# Patient Record
Sex: FEMALE | Race: WHITE | NOT HISPANIC OR LATINO | Employment: STUDENT | URBAN - METROPOLITAN AREA
[De-identification: names, ages, dates, MRNs, and addresses within clinical notes are randomized per-mention and may not be internally consistent; named-entity substitution may affect disease eponyms.]

---

## 2023-06-24 LAB
CHLAMYDIA TRACH., AMPLIFIED: NEGATIVE
N. GONORRHEA, AMPLIFIED: NEGATIVE
TRICHOMONAS VAGINALIS: NEGATIVE

## 2024-07-11 ENCOUNTER — HOSPITAL ENCOUNTER (OUTPATIENT)
Dept: RADIOLOGY | Facility: CLINIC | Age: 19
Discharge: HOME | End: 2024-07-11
Payer: COMMERCIAL

## 2024-07-11 ENCOUNTER — APPOINTMENT (OUTPATIENT)
Dept: ORTHOPEDIC SURGERY | Facility: CLINIC | Age: 19
End: 2024-07-11
Payer: COMMERCIAL

## 2024-07-11 DIAGNOSIS — S86.899A ANTERIOR SHIN SPLINTS: ICD-10-CM

## 2024-07-11 DIAGNOSIS — M25.562 ACUTE PAIN OF LEFT KNEE: ICD-10-CM

## 2024-07-11 PROCEDURE — 99203 OFFICE O/P NEW LOW 30 MIN: CPT | Performed by: ORTHOPAEDIC SURGERY

## 2024-07-11 PROCEDURE — 73564 X-RAY EXAM KNEE 4 OR MORE: CPT | Mod: LEFT SIDE | Performed by: RADIOLOGY

## 2024-07-11 PROCEDURE — 73590 X-RAY EXAM OF LOWER LEG: CPT | Mod: RT

## 2024-07-11 PROCEDURE — 73564 X-RAY EXAM KNEE 4 OR MORE: CPT | Mod: LT

## 2024-07-11 PROCEDURE — 73590 X-RAY EXAM OF LOWER LEG: CPT | Mod: LT

## 2024-07-11 RX ORDER — MELOXICAM 15 MG/1
15 TABLET ORAL
Qty: 30 TABLET | Refills: 2 | Status: SHIPPED | OUTPATIENT
Start: 2024-07-11

## 2024-07-11 NOTE — PROGRESS NOTES
"  History of Present Illness:  Patient presents with left knee pain.  The patient localizes diffuse anterior pain.  The patient notes an insidious onset but no recent trauma.  The patient denies mechanical symptoms.  Pain is moderate, achy, diffuse.  Better with rest, worse with activity.     The patient notes that she has noticed this left knee pain that is worse on the right over the past several years and is progressively worsening as she participates in more intense physical activity.  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  She notes increased swelling in the left knee after prolonged physical activity as well as pain along the anterior aspect of the knee with a \"strange feeling\" that occurs along with the swelling.  She denies any obvious mechanical symptoms today such as catching, locking, giving out.    She also endorses symptoms consistent with anterior tibial stress syndrome bilaterally, left worse than right.  She has been utilizing different techniques for running shoes as well as changing the running service that she has been participating on.  She states these have helped her and she notices less symptoms of the shinsplints but wanted to be evaluated just in case.    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  No current outpatient medications on file.    Review of Systems   GENERAL: Negative for malaise, significant weight loss, fever  MUSCULOSKELETAL: see HPI  NEURO:  Negative    Physical Examination:  Left Knee:  No varus or valgus alignment noted  Mild atrophy of VMO  No significant effusion noted  No tenderness over medial or lateral joint line  Tenderness noted with patellar ballotment and " compression and Kilmichael test  Tenderness and mild fullness hoffas fat pad  Mild maltracking noted, with a lateral tilt at extension.  Ligaments stable (varus, valgus, lachman, drawer)  Equivocal mcmurrays  Neurovascular exam within normal limits distally    Negative anterior drawer, negative Lachman, no significant laxity to varus or valgus stress.    Imaging:  No fractures or degenerative changes noted, mild lateral patellar maltracking noted of the left knee on merchant views with mild DJD of the lateral facet patella.  Bilateral tib-fib x-rays reveal no evidence of stress fracture, no acute fracture or dislocation.    Assessment:  Patient with left knee pain consistent with patellofemoral syndrome with lateral patellar tilt and patellar maltracking.  Bilateral anterior tibial stress syndrome    Plan:  We discussed with the patient the differential for anterior knee pain (tendonitis, synovial fat pad impingement, chondromalacia, maltracking, etc).  We discussed treatment modalities including core, hip, quad strengthening as well as hamstring flexibility.  The role of PT, bracing and NSAIDs reviewed and role of surgical intervention for refractory cases.  We do feel the patient may be experiencing 1 or more of the above causes for her anterior knee pain and swelling and we recommended she monitor the symptoms closely over the course of the next several months while she participates in physical therapy to strengthen the quadriceps muscle groups.  If she were to develop more substantial mechanical symptoms such as catching, locking giving out as well as symptoms such as locked knees or profound pain we would recommend MRI at that time to further evaluate the degree of internal derangement.  Strongly recommended physical therapy for now and she will take an order of this to her [employer] to continue her care as needed.    Recommended NSAIDs for today. A nonsteroidal anti-inflammatory drug (NSAID) was prescribed.  We  reviewed the risks (including gastric issues, renal issues) and benefits of the medication.  We discussed a scheduled course if no adverse reactions to help with swelling / pain.  We discussed that chronic usage should be checked with primary care physician.       Also encouraged options to continue her activity modification involving her anterior tibial stress syndrome with appropriate running shoes, running on softer surfaces, and limiting activity due to pain as needed.    Camden Spears PA-C    In a face to face encounter, I evaluated the patient and performed a physical examination, discussed pertinent diagnostic studies if indicated and discussed diagnosis and management strategies with both the patient and physician assistant / nurse practitioner.  I reviewed the PA/NP's note and agree with the documented findings and plan of care.    Patient with some bilateral shinsplints discussed runners evaluation, formal therapy and anti-inflammatories.  She also has some persistent knee pain is anterior but also medial we discussed the possibility of meniscal pathology as well as a prominent plica.  Discussed MRI if she fails to improve.    Chema Hartley MD

## 2024-07-12 ENCOUNTER — TELEPHONE (OUTPATIENT)
Dept: ORTHOPEDIC SURGERY | Facility: CLINIC | Age: 19
End: 2024-07-12
Payer: COMMERCIAL

## 2024-07-12 NOTE — TELEPHONE ENCOUNTER
Goal Outcome Evaluation:   Patient stands and sits with supervision. Ambulates 225' with Rwx and SBA. Actively works thru UE and LE exercises. Patient does independently exercises during the day and states he will be doing them at home.will continue to benefit from strengthening activities.               Pt called and said she was seen ,and was recommenced she go to T[facility] in [place], they are booked ut until the end of August, I think she rreturns to school before that, is there another place that Dr Higgins might want her to go, willing to go anywhere, she is aware Dr Higgins is not back in the office till Tuesday.

## 2024-07-15 ENCOUNTER — TELEPHONE (OUTPATIENT)
Dept: ORTHOPEDIC SURGERY | Facility: CLINIC | Age: 19
End: 2024-07-15
Payer: COMMERCIAL

## 2024-07-15 NOTE — TELEPHONE ENCOUNTER
Lvm for patient, she can have runners eval done anywhere. Recommended calling the back of her ins card for other options.

## 2024-07-18 ENCOUNTER — EVALUATION (OUTPATIENT)
Dept: PHYSICAL THERAPY | Facility: CLINIC | Age: 19
End: 2024-07-18
Payer: COMMERCIAL

## 2024-07-18 DIAGNOSIS — S86.899A ANTERIOR SHIN SPLINTS: ICD-10-CM

## 2024-07-18 DIAGNOSIS — M25.562 ACUTE PAIN OF LEFT KNEE: Primary | ICD-10-CM

## 2024-07-18 PROCEDURE — 97161 PT EVAL LOW COMPLEX 20 MIN: CPT | Mod: GP

## 2024-07-18 PROCEDURE — 97110 THERAPEUTIC EXERCISES: CPT | Mod: GP

## 2024-07-18 ASSESSMENT — PATIENT HEALTH QUESTIONNAIRE - PHQ9
SUM OF ALL RESPONSES TO PHQ9 QUESTIONS 1 AND 2: 0
1. LITTLE INTEREST OR PLEASURE IN DOING THINGS: NOT AT ALL
2. FEELING DOWN, DEPRESSED OR HOPELESS: NOT AT ALL

## 2024-07-18 NOTE — PROGRESS NOTES
Physical Therapy Evaluation and Treatment      Patient Name: Mindy Moseley  MRN: [id number]  Today's Date: 7/18/2024  Time Calculation  Start Time: 1725  Stop Time: 1805  Time Calculation (min): 40 min    Therapy Diagnoses:    Problem List Items Addressed This Visit    None  Visit Diagnoses         Codes    Acute pain of left knee    -  Primary M25.562    Relevant Orders    Follow Up In Physical Therapy    Anterior shin splints     S86.899A            Visit #: 1     Insurance:   Payor: MEDICAL [facility] / Plan: [employer]ICAL [employer] / Product Type: *No Product type* /   Authorization required: no   Reason for visit: Acute pain of left knee; anterior shin splints  Referring Physician: Chema Hartley MD  $15 COPAY 60 PT/OT V PCY 0 USED NO AUTH NEEDED PAYS % OOP 3175.00 30.00 APPLIED //     Assessment:   Mindy Moseley is a 18 y.o. year old female who participated in a physical therapy evaluation today due to complaint of L knee pain and IVA shin pain. Patient reports pain started around 04/2024 when she was playing rugby. Denies any specific fall or hit that would have caused pain, but reports gradual and worsening onset of pain in IVA shins and L knee since. Reports pain in L knee worsened in late May after another tournament. Patient reports she had a session of informal PT done through her school where she was given stretches which helped temporarily, but did not provide lasting relief of sx. Consulted Dr. Hartley and was recommended PT. Reports sx have been the same since stopping her sx. Reports minor pain in shins when driving and walking. Reports she has stopped running and would like to get back into it.. The patient's findings are consistent with dx of L knee pain 2/2 dx of patellofemoral pain and shin splints. Their impairments include Pain, Strength, Activity limitations, and Participation restrictions. Due to these impairments the patient is unable to perform Sport. Activity limitations and  "participations restrictions include running and playing rugby. Mindy will benefit from continued skilled physical therapy as well as development of a home exercise program to address current impairments and progress towards return to their prior level of function without limitations.    Rehab Potential: Good    Clinical Presentation:  Stable and/or uncomplicated characteristics    Level of Complexity: Low      Subjective:  Reason For Visit: Initial Evaluation  - CC: Patient arrives with complaint of L knee pain and IVA shin splints  - DOI: 04/2024  - DOS: No surgery found  - DOROTHY:  overuse  - IMAGING: x-ray available XR 7/11/24 : \"IMPRESSION:  Normal radiographs of the left tibia/fibula\" IMPRESSION:  Normal radiographs of the right tibia/fibula\"  - Pertinent PMH: none  - Pertinent PSH: none    - HX: Reports pain started around 04/2024 when she was playing rugby. Denies any specific fall or hit that would have caused pain, but reports gradual and worsening onset of pain in IVA shins and L knee since. Reports pain in L knee worsened in late May after another tournament. Patient reports she had a session of informal PT done through her school where she was given stretches which helped temporarily, but did not provide lasting relief of sx. Consulted Dr. Hartley and was recommended PT. Reports sx have been the same since stopping her sx. Reports minor pain in shins when driving and walking. Reports she has stopped running and would like to get back into it.     - PAIN: CURRENT: (1/10); BEST: (0/10); WORST: (4/10); LOCATION: (L knee medial aspect); Description: \"tired, worn out\"  - ALLEVIATES PAIN: rest  - EXACERBATES PAIN: walking, driving, running  - CURRENT MEDICAL MANAGEMENT: none  - PLOF: Patient reports no functional limitations prior to injury.   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 No  Fever/chills, unexplained weight changes, dizziness/fainting, unexplained change in bowel or bladder functions, unexplained malaise or muscle weakness, night pain/sweats, numbness or tingling    Objective:    Gait Assessment - unremarkable    AROM  Right knee flexion: WNL   Left knee flexion: WNL  Right knee extension: WNL    Left knee extension: WNL    MMT /5   Right quadriceps: 5  Left quadriceps: 5  Right iliopsoas: 5   Left iliopsoas: 5  Right hamstrin    Left hamstrin  Right ankle DF: 5  Left ankle DF: 5    Palpation  No TTP to L knee joint    Special Tests  Varus R/L (-/-)  Valgus R/L (-/-)  Lachman R/L (-/-)  Veronica R/L (-/-)  Posterior Drawer R/L (-/-)  Patellar Grind R/L (-/-)  Squat - unremarkable   Single leg step down - mild valgus noted in IVA knee    Other Measures  Lower Extremity Funtional Score (LEFS): 70       Goals:  1.) Independent with HEP to expedite progress and promote goal achievement.  2.) Reduce worst reported pain within last 48 hours to < 2/10 in order to meet MCID.  3.) Improve L LE strength to evidenced by ability to perform single leg squat with no knee valgus in order to allow patient to return to PLOF and tolerate high impact forces like running without reproduction of sx.  4.) Report change in LEFS by greater than or equal to 9 points to meet the MCID (IE 70/80)  5.) Patient will report no increase in sx with running > 30 minutes.         Treatment:   1) Initial evaluation - Low complexity (20 minutes)   2) Patient educated on POC, HEP, and current condition.   3) Treatment Performed:    Therapeutic Exercise:    20 min  Single leg bridge x10 each  SL Hip ABD plank x10 each  Toe raise with back against wall x10  LAQ vs GTB x10 each    4) HEP Provided (See Below)      Access Code: [email or web address]  URL: [email or web address]  Date: 07/18/2024  Prepared by: Ailyn Westbrook    Exercises  - Single Leg Bridge  - 1 x daily - 7 x weekly - 3 sets - 10 reps  - Toe Raise With Back Against Wall  - 1 x daily - 7 x weekly - 2 sets - 25 reps  - Modified Side Plank with Hip Abduction  - 1 x daily - 7 x weekly - 3 sets - 10 reps  - Sitting Knee Extension with Resistance  - 1 x daily - 7 x weekly - 3 sets - 10 reps     Plan:     Recommended Treatment:  Therapeutic exercise, Manual therapy, Gait training, Home program instruction and progression, Neuromuscular re-education, Therapeutic activities, Self care and home management, Instruction in activity modification, Electrical stimulation, Vasopneumatic device + cold, Cryotherapy, and Dry Needling    Recommended Visits: 1x/wk for 2 visits    Patient in agreement with POC.    Azael Murcia, PT

## 2024-07-18 NOTE — PROGRESS NOTES
"Physical Therapy Evaluation      Patient Name: Mindy Moseley  MRN: [id number]  Today's Date: 2024       Therapy Diagnoses:    Problem List Items Addressed This Visit    None      Visit #: 1     Insurance:   Payor: MEDICAL [facility] / Plan: MEDICAL [employer] / Product Type: *No Product type* /   Authorization required: {yes/no:16491}  Authorized visits: ***  Authorized date range: ***   Referring Physician: ***    Assessment:   Mindy Moseley is a 18 y.o. year old female who participated in a physical therapy evaluation today due to complaint of ***. Patient reports ***. The patient's findings are consistent with ***. Their impairments include {Impairments:52007}. Due to these impairments the patient is unable to perform {PT functional limitations:70826}. Mindy will benefit from continued skilled physical therapy as well as development of a home exercise program to address current impairments and progress towards return to their prior level of function without limitations.    Rehab Potential: {GOOD/FAIR/POOR:09758}    Clinical Presentation:  Stable and/or uncomplicated characteristics    Level of Complexity: Low      Subjective:  Reason For Visit: Initial Evaluation  - CC: Patient arrives with complaint of ***  - DOI: ***  - DOS: No surgery found  - DOROTHY: {DOROTHY:28966}  - IMAGING: {imagin} available - {imagingpart2:36682}  - HX: ***  - PAIN: CURRENT: ({0-10:40680}/10); BEST: ({0-10:54464}/10); WORST: ({0-10:52609}/10); LOCATION: (***); Description: ***  - ALLEVIATES PAIN: {alleviatespain:56056}  - EXACERBATES PAIN: {exacerbatespain:30025}  - CURRENT MEDICAL MANAGEMENT: ***  - PLOF: Patient reports no functional limitations prior to injury.   - FUNCTIONAL LIMITATIONS: {PT functional limitations:48459}  - LIVING ENVIRONMENT: ***  - WORK: {work:76982}  - EXERCISE: ***  - PATIENT'S GOAL: {patientgoals:80385}       Precautions:  Fall Risk: {Fall Risk:84408::\"None\"}   PMH: ***       Objective:   {PT " Outcome Measures:96529}       Goals:  ***    Treatment:   1) Initial evaluation - Low complexity (  minutes)   2) Patient educated on POC, HEP, and current condition.   3) Therapeutic exercise performed (  minutes)  4) HEP Provided (See Below)     ***     Plan:     Recommended Treatment:  {Treatment Modalities:91737}    Recommended Visits: *** visits x *** weeks     Patient in agreement with POC.

## 2024-07-26 ENCOUNTER — TREATMENT (OUTPATIENT)
Dept: PHYSICAL THERAPY | Facility: CLINIC | Age: 19
End: 2024-07-26
Payer: COMMERCIAL

## 2024-07-26 DIAGNOSIS — M25.562 ACUTE PAIN OF LEFT KNEE: ICD-10-CM

## 2024-07-26 PROCEDURE — 97110 THERAPEUTIC EXERCISES: CPT | Mod: GP

## 2024-07-26 NOTE — PROGRESS NOTES
PHYSICAL THERAPY TREATMENT NOTE    Patient Name:  Mindy Moseley   Patient MRN: [id number]  Date: 07/26/24  Time Calculation  Start Time: 0829  Stop Time: 0914  Time Calculation (min): 45 min      Problem List Items Addressed This Visit    None  Visit Diagnoses         Codes    Acute pain of left knee     M25.562            Insurance:  Visit number: 2  Insurance Type: Payor: [employer] [place] / Plan: [employer]ICAL [employer] / Product Type: *No Product type* / $15 COPAY 60 PT/OT V PCY 0 USED NO AUTH NEEDED PAYS % OOP 3175.00 30.00 APPLIED //       General:  Reason for visit: Acute pain of left knee; anterior shin splints    Referred by: Chema De La Torre MD appt:  n/a     Precautions:  Fall Risk: None    Assessment:  Patient tolerated treatment session well. No increase in sx with jogging or strengthening interventions. Upon review of HEP, patient was able to perform interventions properly without cueing from therapist and was able to progress exercises with increased resistance. Required moderate VC and visual demo from therapist for proper completion of new interventions with patient demo'ing good performance with repetitions. Updated HEP to include indicated interventions. Encouraged patient to attempt to run 1-2x prior to next visit and attempt mid-foot strikes. Would benefit from continued PT services to further assess and address remaining impairments and progress towards achieving established goals.     Education: Reviewed home exercise program. Home exercise program instructed and issued. Answered questions about home exercise program. Provided manual cues for correct performance of exercises. Provided verbal feedback to improve exercise technique.  Progress towards functional goals:  Improved tolerance to running  Response to interventions: Patient tolerated therapeutic  "interventions well and without any adverse events. Improved independence with home exercises. Improved tolerance to strengthening progressions.  Justification for continued skilled care: To address remaining functional, objective and subjective deficits to allow them to return to full independence with ADLs. Assess effectiveness of HEP. Modify and progress home exercise program.    Plan:  Assess tolerance to updated HEP and running program. Re-check NV and introduce lateral tap downs, wall sits, and single leg STS.    Subjective:  Patient reports she went for 2 runs since last visit. 1st run 20 minutes in length with mild discomfort 5 minutes in that did not worsen and improved afterwards. 15 minute run a few days later with no problems.    Mindy reports she feels like her condition is improving.  Progress towards functional goal:   Improved tolerance to running   Pain (0-10): 0    HEP adherence / understanding: compliance with the instructed home exercises.    Objective:    Treatment Performed: (\"NP\" = Not Performed)     Therapeutic Exercise:     43 minutes  Treadmill warmup 5 min 5 mph  LAQ vs BluTB x12 each  Anterior tib raises against wall x20   SL hip ABD plank x30 sec hold each side *  Glute med wall lean iso vs GreenPB x3 10 sec holds each  Captain charlie squats with PB x2 each  Single leg HS PB curls 2x5 each *  Yakut split squats x5 each  Heel elevated Ugandan split squats x10 each *  Reverse nordics 2x10 (gray TB, BluTB)*  Treadmill jog with mid-foot strike 5.5 mph 5 minutes    Not performed today:  Single leg wall sits  Lateral tap downs  Single leg STS    "

## 2024-07-29 ENCOUNTER — TREATMENT (OUTPATIENT)
Dept: PHYSICAL THERAPY | Facility: CLINIC | Age: 19
End: 2024-07-29
Payer: COMMERCIAL

## 2024-07-29 DIAGNOSIS — M25.562 ACUTE PAIN OF LEFT KNEE: ICD-10-CM

## 2024-07-29 PROCEDURE — 97110 THERAPEUTIC EXERCISES: CPT | Mod: GP

## 2024-07-29 NOTE — PROGRESS NOTES
PHYSICAL THERAPY TREATMENT NOTE/DISCHARGE SUMMARY    Patient Name:  Mindy Moseley   Patient MRN: [id number]  Date: 07/29/24  Time Calculation  Start Time: 0815  Stop Time: 0836  Time Calculation (min): 21 min      Problem List Items Addressed This Visit    None  Visit Diagnoses         Codes    Acute pain of left knee     M25.562            Insurance:  Visit number: 3  Insurance Type: Payor: [employer] [place] / Plan: [employer] / Product Type: *No Product type* / $15 COPAY 60 PT/OT V PCY 0 USED NO AUTH NEEDED PAYS % OOP 3175.00 30.00 APPLIED //         General:  Reason for visit: Acute pain of left knee; anterior shin splints    Referred by: Chema De La Torre MD appt:  n/a      Precautions:  Fall Risk: None    Assessment:  Upon re-assessment of objective measures patient demonstrated good control with lateral step down from 8in. Step. Demonstrated mild medial translation of IVA tibia and mild pelvic elevation to compensate for quad control. Also reported improved response on LEFS to 80/80 from 70/80 at IE. Upon review of goals and objective measures, patient expressed no remaining concerns or limitations for returning to running or returning to sport. Encouraged patient to continue with implementation of iHEP or some form of strengthening regiment 3-4x/wk to further progress total body strength and prevent re-provocation of sx in L or R knee with running/sport activities. Also recommended patient discontinue midfoot strike running strategy if pain in R knee persists when running with this pattern. Patient did not wish to review HEP as she was confident in her ability to independently perform. Updated iHEP to include single leg wall sits with patient able to perform independently without pain beyond initial visual demo and verbal cueing. Recommending patient be discharged  "from PT services at this time due to all goals met and due to patient leaving for study abroad program in 5 days. Patient discharged with iHEP. Patient denied any remaining questions or concerns for therapist at this time.     Education: Home exercise program instructed and issued. Answered questions about home exercise program. Provided verbal feedback to improve exercise technique.  Progress towards functional goals:  Improved tolerance to running. Improved ability to participate in sport. Reduced frequency of pain. Reduced intensity of pain. Reduced pain level.  Response to interventions: Patient tolerated therapeutic interventions well and without any adverse events. Improved independence with home exercises.    Plan:  Discharge from physical therapy.    Subjective:   Mindy reports she feels like her condition is improving.  Progress towards functional goal:  Improved ability to participate in sport. Reduced frequency of pain. Reduced pain level. Patient reports she was able to run two times since last session for approx 25 minutes each and did not have any pain in L knee, but did have some pain in R knee which she attributes to possibly changing to forefoot strike with running. Patient expressed she is not too concerned with R knee pain as it was not very bothersome and went away after resting.    Pain (0-10): 0    HEP adherence / understanding: compliance with the instructed home exercises.    Objective:  Single leg step down 8 in. Step - +2 IVA with mild pelvic elevation and medial tibial translation to 2nd toe    LEFS: 80/80    Treatment Performed: (\"NP\" = Not Performed)     Therapeutic Exercise:       minutes  Treadmill warmup 5 min 5 mph  Review of goals and objective measures - 5 minutes  Single leg wall sits x5 each 10 sec holds       Not performed today:  LAQ vs BluTB x12 each  Anterior tib raises against wall x20   SL hip ABD plank x30 sec hold each side *  Glute med wall lean iso vs GreenPB x3 10 sec " holds each  Captain charlie squats with PB x2 each  Single leg HS PB curls 2x5 each *  [place]ankan split squats x5 each  Heel elevated Ivorian split squats x10 each *  Reverse nordics 2x10 (gray TB, BluTB)*  Treadmill jog with mid-foot strike 5.5 mph 5 minutes  Lateral tap downs  Single leg STS        Goals:  1.) Independent with HEP to expedite progress and promote goal achievement. (7/29/24 - met)  2.) Reduce worst reported pain within last 48 hours to < 2/10 in order to meet MCID. (7/29/24 - met)  3.) Improve L LE strength to evidenced by ability to perform single leg squat with no knee valgus in order to allow patient to return to PLOF and tolerate high impact forces like running without reproduction of sx. (7/29/24 - partially met)  4.) Report change in LEFS by greater than or equal to 9 points to meet the MCID (IE 70/80) (7/29/24 - met)  5.) Patient will report no increase in sx with running > 30 minutes. (7/29/24 - 90% met)